# Patient Record
Sex: FEMALE | Race: OTHER | URBAN - METROPOLITAN AREA
[De-identification: names, ages, dates, MRNs, and addresses within clinical notes are randomized per-mention and may not be internally consistent; named-entity substitution may affect disease eponyms.]

---

## 2023-01-17 ENCOUNTER — OFFICE VISIT (OUTPATIENT)
Dept: ORTHOPEDIC SURGERY | Age: 43
End: 2023-01-17
Payer: OTHER MISCELLANEOUS

## 2023-01-17 VITALS — BODY MASS INDEX: 36.15 KG/M2 | WEIGHT: 217 LBS | HEIGHT: 65 IN

## 2023-01-17 DIAGNOSIS — M25.511 ACUTE PAIN OF RIGHT SHOULDER: Primary | ICD-10-CM

## 2023-01-17 DIAGNOSIS — M25.511 PAIN IN RIGHT ACROMIOCLAVICULAR JOINT: ICD-10-CM

## 2023-01-17 PROCEDURE — 99203 OFFICE O/P NEW LOW 30 MIN: CPT | Performed by: ORTHOPAEDIC SURGERY

## 2023-01-17 NOTE — PROGRESS NOTES
Vandana Sanchez (: 1980) is a 36 y.o. female, patient, here for evaluation of the following chief complaint(s):  Shoulder Pain (Right shoulder )       HPI:    Patient presents the office today with a chief complaint of right shoulder pain. Patient states she developed pain while at work. She reports reaching forward and suddenly developed pain and points along the anterior aspect of the shoulder area. Every time she reaches forward she has recurrent shoulder pain. She denies numbness or tingling. She does have some pain that radiates up the neck but not down the arm and certainly not in the back of the shoulder. She did not develop any swelling or black and blue. She apparently has seen an occupational medicine doctor and is currently on job restrictions    Not on File    No current outpatient medications on file. No current facility-administered medications for this visit. No past medical history on file. No past surgical history on file. No family history on file.      Social History     Socioeconomic History    Marital status: UNKNOWN     Spouse name: Not on file    Number of children: Not on file    Years of education: Not on file    Highest education level: Not on file   Occupational History    Not on file   Tobacco Use    Smoking status: Not on file    Smokeless tobacco: Not on file   Substance and Sexual Activity    Alcohol use: Not on file    Drug use: Not on file    Sexual activity: Not on file   Other Topics Concern    Not on file   Social History Narrative    Not on file     Social Determinants of Health     Financial Resource Strain: Not on file   Food Insecurity: Not on file   Transportation Needs: Not on file   Physical Activity: Not on file   Stress: Not on file   Social Connections: Not on file   Intimate Partner Violence: Not on file   Housing Stability: Not on file       Review of Systems   Musculoskeletal:         Right shoulder      Vitals:  Ht 5' 5\" (1.651 m)   [place] 217 lb (98.4 kg)   BMI 36.11 kg/m²    Body mass index is 36.11 kg/m². Ortho Exam     Patient is alert and oriented x3. Patient is in no acute distress. Patient ambulates with a nonantalgic gait. Right shoulder: No shoulder girdle atrophy . There is no soft tissue swelling, ecchymosis, abrasions or lacerations. Active range of motion of the shoulder is full with forward flexion, lateral abduction and external rotation. Internal rotation is to the SI joint and is painful. Passive range of motion is full with a positive impingement sign and a painful Jenkins sign. Rotator cuff strength is painful to evaluate and is at 4+/5 with forward flexion and lateral abduction. External rotational strength is maintained. There is no crepitation about the joint. Palpation of the [facility] joint does  reproduce discomfort, and there is positive pain elicited with cross-body adduction. Strength of the extremity is 5/5 at biceps/triceps/wrist extension and is comparable to the contralateral side. DTR's are intact at +2/4 and are symmetrical.  Cervical range of motion is full with no pain to palpation along the paraspinal musculature medial border of the scapula. Spurling's sign is negative. Left Shoulder:  No shoulder girdle atrophy . There is no soft tissue swelling, ecchymosis, abrasions or lacerations. Active range of motion is full with forward flexion, lateral abduction and external rotation. Internal rotation is to the upper lumbar level with a negative lift-off sign. Passive range of motion is full with a negative impingement sign and a negative Jenkins sign. Rotator cuff strength with forward flexion, lateral abduction and external rotation is intact with 5/5 strength. There is no crepitation about the joint. Palpation of the [facility] joint does not reproduce discomfort, and there is no pain elicited with cross-body adduction. Strength of the extremity is 5/5 at biceps/triceps/wrist extension.   Neurovascular examination intact    XR Results (most recent):  Results from Appointment encounter on 01/17/23    XR SHOULDER RT AP/LAT MIN 2 V    Narrative  Three-view x-ray of the right shoulder reveals no evidence of fracture dislocation no signs of glenohumeral arthritis. There is mild evidence of acromioclavicular           ASSESSMENT/PLAN:    Patient presents the office today with what appears to be pain in the acromioclavicular joint. I do not believe she has suffered a traumatic strain to this but certainly something is irritated the acromioclavicular joint. This stage, I would recommend physical therapy. We will continue with restricted duty work. I suspect this should resolve in a timely fashion. I would like her to return to the office in 4 weeks for repeat evaluation. We discussed appropriate use of over-the-counter anti-inflammatory medicine.         Ophelia Andrews MD

## 2023-02-27 ENCOUNTER — OFFICE VISIT (OUTPATIENT)
Dept: ORTHOPEDIC SURGERY | Age: 43
End: 2023-02-27
Payer: OTHER MISCELLANEOUS

## 2023-02-27 DIAGNOSIS — M54.12 CERVICAL RADICULITIS: Primary | ICD-10-CM

## 2023-02-27 PROCEDURE — 99213 OFFICE O/P EST LOW 20 MIN: CPT | Performed by: ORTHOPAEDIC SURGERY

## 2023-02-27 NOTE — PROGRESS NOTES
Marko Ahn (: 1980) is a 36 y.o. female, patient, here for evaluation of the following chief complaint(s):  Shoulder Pain (Right shoulder )       HPI:    Patient returns to clinic today for reevaluation of her right shoulder. She was last seen approximately 4 weeks ago and was given a diagnosis of an AC strain. She has been going to physical therapy a few times a week. She states her shoulder pain has improved, but she has recently had an increase in scapular and neck pain. She denies any radiating symptoms down into the arm or hand. She denies any numbness or tingling. She says the physical therapist have been working a little bit on her neck and that it has helped. Not on File    No current outpatient medications on file. No current facility-administered medications for this visit. No past medical history on file. No past surgical history on file. No family history on file. Social History     Socioeconomic History    Marital status: UNKNOWN     Spouse name: Not on file    Number of children: Not on file    Years of education: Not on file    Highest education level: Not on file   Occupational History    Not on file   Tobacco Use    Smoking status: Not on file    Smokeless tobacco: Not on file   Substance and Sexual Activity    Alcohol use: Not on file    Drug use: Not on file    Sexual activity: Not on file   Other Topics Concern    Not on file   Social History Narrative    Not on file     Social Determinants of Health     Financial Resource Strain: Not on file   Food Insecurity: Not on file   Transportation Needs: Not on file   Physical Activity: Not on file   Stress: Not on file   Social Connections: Not on file   Intimate Partner Violence: Not on file   Housing Stability: Not on file       Review of Systems   Musculoskeletal:         Right shoulder      Vitals: There were no vitals taken for this visit. There is no height or weight on file to calculate BMI.     Ortho Exam     Patient is alert and oriented x3. She is in no acute distress. She walks with a nonantalgic gait    Right shoulder: No shoulder girdle atrophy. There is no soft tissue swelling, ecchymosis, abrasions or lacerations. Active range of motion is full with forward flexion, lateral abduction and external rotation. Internal rotation is to the lumbar level with a negative lift-off sign. Passive range of motion is full with a negative impingement sign and a negative Jenkins sign. Rotator cuff strength with forward flexion, lateral abduction and external rotation is intact with 5/5 strength. There is no crepitation about the joint. Palpation of the [facility] joint does not reproduce discomfort, and there is no pain elicited with cross-body adduction. Strength of the extremity is 5/5 at biceps/triceps/wrist extension. DRT's are intact at +2/4 and  symmetrical.  Cervical range of motion is full. She does have tenderness to palpation about the paraspinal musculature to the left and right side. She has a negative Spurling signs but the neck extension does cause some discomfort into her neck and upper back. Left shoulder: No shoulder girdle atrophy. There is no soft tissue swelling, ecchymosis, abrasions or lacerations. Active range of motion is full with forward flexion, lateral abduction and external rotation. Internal rotation is to the lumbar level with a negative lift-off sign. Passive range of motion is full with a negative impingement sign and a negative Jenkins sign. Rotator cuff strength with forward flexion, lateral abduction and external rotation is intact with 5/5 strength. There is no crepitation about the joint. Palpation of the [facility] joint does not reproduce discomfort, and there is no pain elicited with cross-body adduction. Strength of the extremity is 5/5 at biceps/triceps/wrist extension.   DRT's are intact at +2/4 and  symmetrical.        ASSESSMENT/PLAN:      Patient presents the office today with recurrent discomfort of the right upper extremity. However, her pain has changed in nature. Now she has more scapular type pain. The symptoms and signs are more consistent with cervical radiculitis. The majority of anterior shoulder pain has resolved. Therefore, I have changed her work restriction to [place] this finding. Physical therapy is in order for cervical radiculitis it would be reasonable to entertain physical therapy for cervical radiculitis. I am not sure this symptom was not necessarily part of the original work related diagnosis but it certainly has blossomed into a cervical radicular pattern. She is to return to the office in 4 weeks.       Raissa Siddiqui MD

## 2023-03-02 ENCOUNTER — DOCUMENTATION ONLY (OUTPATIENT)
Dept: ORTHOPEDIC SURGERY | Age: 43
End: 2023-03-02

## 2023-03-27 ENCOUNTER — OFFICE VISIT (OUTPATIENT)
Dept: ORTHOPEDIC SURGERY | Age: 43
End: 2023-03-27
Payer: OTHER MISCELLANEOUS

## 2023-03-27 DIAGNOSIS — M54.12 CERVICAL RADICULITIS: Primary | ICD-10-CM

## 2023-03-27 PROCEDURE — 99212 OFFICE O/P EST SF 10 MIN: CPT | Performed by: ORTHOPAEDIC SURGERY

## 2023-03-27 NOTE — PROGRESS NOTES
George Bueno (: 1980) is a 36 y.o. female, patient, here for evaluation of the following chief complaint(s):  Shoulder Pain (Right shoulder)       HPI:    Patient returns to the office with recurrent discomfort of her neck region. She has been in physical therapy. She states the physical therapy has been helpful. She has had improvement of pain. However, she still has pain as she describes and points along her right neck region radiating into her to her trapezius. The anterior shoulder pain of which I saw her initially for has seemingly resolved. Not on File    No current outpatient medications on file. No current facility-administered medications for this visit. No past medical history on file. No past surgical history on file. No family history on file. Social History     Socioeconomic History    Marital status: UNKNOWN     Spouse name: Not on file    Number of children: Not on file    Years of education: Not on file    Highest education level: Not on file   Occupational History    Not on file   Tobacco Use    Smoking status: Not on file    Smokeless tobacco: Not on file   Substance and Sexual Activity    Alcohol use: Not on file    Drug use: Not on file    Sexual activity: Not on file   Other Topics Concern    Not on file   Social History Narrative    Not on file     Social Determinants of Health     Financial Resource Strain: Not on file   Food Insecurity: Not on file   Transportation Needs: Not on file   Physical Activity: Not on file   Stress: Not on file   Social Connections: Not on file   Intimate Partner Violence: Not on file   Housing Stability: Not on file       Review of Systems   Musculoskeletal:         Right shoulder     Vitals: There were no vitals taken for this visit. There is no height or weight on file to calculate BMI. Ortho Exam     Patient is alert and oriented x3. Patient is in no acute distress. Patient ambulates with a nonantalgic gait.     Cervical spine: Patient has full range of motion of the cervical spine. She has tenderness to palpation along her right paraspinal musculature and into the medial border of the scapula. Her tenderness is slightly worsened with lateral rotation. A true Spurling sign is negative. She has full range of motion of the shoulder without crepitation I cannot reproduce pain along her AC joint. Her strength with forward elevation, lateral abduction, external rotation, bicep and tricep contraction as well as wrist extension are all intact. Neurovascular examination is intact      ASSESSMENT/PLAN:    Patient has had modest improvement with physical therapy for it seemingly the diagnosis of cervical radiculitis. At this stage, we could entertain 2 possible options. 1 option is to matriculate back into full duty work and see if her pain subsides with time. Another option is to further her diagnostic scanning of this with an MRI. She and I have both talked about these options and she is elected to proceed back to full duty work. I think this is reasonable and appropriate. If her pain worsens, I would then suggest proceeding with an MRI. Regardless, I would like her to return to the office in 4 weeks so we can make decisions if needed.         Poncho Tian MD

## 2023-03-29 ENCOUNTER — DOCUMENTATION ONLY (OUTPATIENT)
Dept: ORTHOPEDIC SURGERY | Age: 43
End: 2023-03-29

## 2023-03-29 NOTE — PROGRESS NOTES
02/27/23 office ntoes faxed to [facility], [phone], on 03/27/23

## 2023-04-24 ENCOUNTER — OFFICE VISIT (OUTPATIENT)
Dept: ORTHOPEDIC SURGERY | Age: 43
End: 2023-04-24
Payer: OTHER MISCELLANEOUS

## 2023-04-24 DIAGNOSIS — M54.12 CERVICAL RADICULITIS: Primary | ICD-10-CM

## 2023-04-24 PROCEDURE — 99212 OFFICE O/P EST SF 10 MIN: CPT | Performed by: ORTHOPAEDIC SURGERY

## 2023-04-24 NOTE — PROGRESS NOTES
Isabella Jarvis (: 1980) is a 36 y.o. female, patient, here for evaluation of the following chief complaint(s):  Shoulder Pain (Right shoulder )       HPI:    Patient presents the office today status post work related injury to her right upper extremity. Last seen in the office, we returned back to full duty work. She states she did have a little bit of soreness into the shoulder and neck region. However, lately she has been doing quite well. She does speak pretty good [address] but she is also here today with our . Not on File    No current outpatient medications on file. No current facility-administered medications for this visit. No past medical history on file. No past surgical history on file. No family history on file. Social History     Socioeconomic History    Marital status: UNKNOWN     Spouse name: Not on file    Number of children: Not on file    Years of education: Not on file    Highest education level: Not on file   Occupational History    Not on file   Tobacco Use    Smoking status: Not on file    Smokeless tobacco: Not on file   Substance and Sexual Activity    Alcohol use: Not on file    Drug use: Not on file    Sexual activity: Not on file   Other Topics Concern    Not on file   Social History Narrative    Not on file     Social Determinants of Health     Financial Resource Strain: Not on file   Food Insecurity: Not on file   Transportation Needs: Not on file   Physical Activity: Not on file   Stress: Not on file   Social Connections: Not on file   Intimate Partner Violence: Not on file   Housing Stability: Not on file       Review of Systems   Musculoskeletal:         Right shoulder      Vitals: There were no vitals taken for this visit. There is no height or weight on file to calculate BMI. Ortho Exam     Patient is alert and oriented x3. Patient is in no acute distress. Patient ambulates with a nonantalgic gait.       Cervical spine: Patient has full range of motion cervical spine. She has tenderness noted to palpation along the mid muscle belly aspect of the trapezius. She has a negative Spurling sign. She has normal strength with bicep, tricep and wrist extension. Right shoulder: Patient has full range of motion of the shoulder without provocative pain. Patient has normal strength throughout range of motion. Negative impingement negative Jenkins. Neurovascular examination is intact. ASSESSMENT/PLAN:    Patient is doing very well. I think would be reasonable to discharge her at this stage. She has reached maximal medical improvement. She has been returned back to full duty work. If she has any further problems with the shoulder, I am happy to see her back.         Randee Tinajero MD